# Patient Record
Sex: MALE | Race: WHITE | NOT HISPANIC OR LATINO
[De-identification: names, ages, dates, MRNs, and addresses within clinical notes are randomized per-mention and may not be internally consistent; named-entity substitution may affect disease eponyms.]

---

## 2021-03-25 ENCOUNTER — FORM ENCOUNTER (OUTPATIENT)
Age: 56
End: 2021-03-25

## 2021-03-26 ENCOUNTER — OUTPATIENT (OUTPATIENT)
Dept: INPATIENT UNIT | Facility: HOSPITAL | Age: 56
LOS: 1 days | Discharge: HOME | End: 2021-03-26

## 2021-03-26 ENCOUNTER — TRANSCRIPTION ENCOUNTER (OUTPATIENT)
Age: 56
End: 2021-03-26

## 2021-03-26 ENCOUNTER — APPOINTMENT (OUTPATIENT)
Dept: DISASTER EMERGENCY | Facility: CLINIC | Age: 56
End: 2021-03-26

## 2021-03-26 VITALS
HEART RATE: 76 BPM | WEIGHT: 265 LBS | HEIGHT: 69 IN | DIASTOLIC BLOOD PRESSURE: 88 MMHG | TEMPERATURE: 99 F | OXYGEN SATURATION: 95 % | SYSTOLIC BLOOD PRESSURE: 138 MMHG

## 2021-03-26 VITALS
DIASTOLIC BLOOD PRESSURE: 90 MMHG | HEART RATE: 77 BPM | TEMPERATURE: 99 F | SYSTOLIC BLOOD PRESSURE: 135 MMHG | OXYGEN SATURATION: 95 %

## 2021-03-26 DIAGNOSIS — U07.1 COVID-19: ICD-10-CM

## 2021-03-26 PROBLEM — Z00.00 ENCOUNTER FOR PREVENTIVE HEALTH EXAMINATION: Status: ACTIVE | Noted: 2021-03-26

## 2021-03-26 RX ORDER — SODIUM CHLORIDE 9 MG/ML
250 INJECTION INTRAMUSCULAR; INTRAVENOUS; SUBCUTANEOUS
Refills: 0 | Status: DISCONTINUED | OUTPATIENT
Start: 2021-03-26 | End: 2021-03-26

## 2021-03-26 RX ADMIN — SODIUM CHLORIDE 25 MILLILITER(S): 9 INJECTION INTRAMUSCULAR; INTRAVENOUS; SUBCUTANEOUS at 15:03

## 2021-03-26 NOTE — MONOCLONAL ANTIBODY INFUSION - ASSESSMENT AND PLAN
Patient is COVID + and received Casivirimab and Imdevimab IV over 1 hour, followed by 1 hour of observation.  Pt tolerated procedure well.    Given discharge instructions, Fact Sheet for EUA of REGEN-COV.  Pt education about spirometer use was done and pt was given a spirometer.  Pt advised to follow up with PCP, Dr. Yu. If he develops any SOB or difficulty breathing with pulse ox <92, call PCP or go to ER immediately.  Pt was discharged in stable condition.

## 2021-03-26 NOTE — MONOCLONAL ANTIBODY INFUSION - EXAM
57 yo male here at the monoclonal ab infusion site for Infusion of Casirivimab/Indevimab for COVID.  He was tested positive for COVID on 3/25/21 (yesterday).  He started to develop symptoms- fever, congestion, aches & pains yesterday.  Denies cough, SOB, or difficulty breathing.    Ht=5'9"  Bu=567    VSS  Pulse Ox 95% upon arrival    HEENT: normal  Lungs: clear, BS  equal  Heart: S1S2 regular

## 2021-03-27 ENCOUNTER — TRANSCRIPTION ENCOUNTER (OUTPATIENT)
Age: 56
End: 2021-03-27

## 2021-03-31 ENCOUNTER — TRANSCRIPTION ENCOUNTER (OUTPATIENT)
Age: 56
End: 2021-03-31

## 2021-10-14 ENCOUNTER — APPOINTMENT (OUTPATIENT)
Dept: ORTHOPEDIC SURGERY | Facility: CLINIC | Age: 56
End: 2021-10-14
Payer: COMMERCIAL

## 2021-10-14 VITALS — BODY MASS INDEX: 35.21 KG/M2 | RESPIRATION RATE: 16 BRPM | WEIGHT: 260 LBS | HEIGHT: 72 IN

## 2021-10-14 DIAGNOSIS — Z78.9 OTHER SPECIFIED HEALTH STATUS: ICD-10-CM

## 2021-10-14 DIAGNOSIS — M67.40 GANGLION, UNSPECIFIED SITE: ICD-10-CM

## 2021-10-14 DIAGNOSIS — Z72.89 OTHER PROBLEMS RELATED TO LIFESTYLE: ICD-10-CM

## 2021-10-14 DIAGNOSIS — R22.32 LOCALIZED SWELLING, MASS AND LUMP, LEFT UPPER LIMB: ICD-10-CM

## 2021-10-14 DIAGNOSIS — E78.00 PURE HYPERCHOLESTEROLEMIA, UNSPECIFIED: ICD-10-CM

## 2021-10-14 PROCEDURE — 73140 X-RAY EXAM OF FINGER(S): CPT | Mod: F2

## 2021-10-14 PROCEDURE — 99203 OFFICE O/P NEW LOW 30 MIN: CPT

## 2021-10-14 PROCEDURE — 20612 ASPIRATE/INJ GANGLION CYST: CPT | Mod: F2

## 2021-10-14 PROCEDURE — 76882 US LMTD JT/FCL EVL NVASC XTR: CPT

## 2021-10-14 RX ORDER — ROSUVASTATIN CALCIUM 5 MG/1
TABLET, FILM COATED ORAL
Refills: 0 | Status: ACTIVE | COMMUNITY

## 2021-10-14 NOTE — ASSESSMENT
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 Options ranged from conservative care to surgical intervention were reviewed and all questions answered. Patient appeared to have an excellent understanding of the risks as well as differential diagnosis associated with this condition.\par \par Tabletop test was discussed, as well as the diagnosis of Dupuytren's. Options were discussed ranging from conservative care, needles, and medicines, versus palmar digital fasciectomy.\par \par Since there is no contracture. He elected conservative management and observation.\par \par Differential diagnosis, as well as options for the soft tissue mass was discussed, including further workup, aspiration, and injection, or surgical excision.\par \par He elected to proceed with aspiration of the left third soft tissue mass, which yielded gelatinous material and resolution of the mass.\par \par He elected to proceed with an injection for the left third tendon sheath.\par \par After the area was prepped with alcohol to reduce the risk of infection. The area was aspirated with resolution of the mass. The A1 pulley was then filled with a half cc of Kenalog, 10-1/2 cc of 2% lidocaine. Hemostasis is obtained by direct pressure, and a home program as outlined including surveillance.\par \par It is hoped that this has a long-lasting beneficial therapeutic effect. If symptoms are not fully resolved by 4-6 weeks the patient was asked to return to my office for reevaluation. If symptoms are resolved they can return on an as-needed basis.

## 2021-10-14 NOTE — CONSULT LETTER
[Dear  ___] : Dear  [unfilled], [Consult Letter:] : I had the pleasure of evaluating your patient, [unfilled]. [Please see my note below.] : Please see my note below. [Consult Closing:] : Thank you very much for allowing me to participate in the care of this patient.  If you have any questions, please do not hesitate to contact me. [Sincerely,] : Sincerely, [FreeTextEntry3] : Suhas Keys M.D., FAAOS\par Co-Director\par [facility]\par

## 2021-10-14 NOTE — HISTORY OF PRESENT ILLNESS
[Right] : right hand dominant [FreeTextEntry1] : Pt here for several weeks of left middle volar soft tissue mass that hurts when pressed on.  No trauma to finger.\par \par He is noted thickening at the base of the left fourth digit for several years without history of trauma. This is been without change in his painless.

## 2021-10-14 NOTE — PHYSICAL EXAM
[de-identified] : Physical exam shows the patient to be alert and oriented x3, capable of ambulation. The patient is well-developed and well-nourished in no apparent respiratory distress. Majority of the skin is intact bilaterally in the upper extremities without lymphadenopathy at the elbows.\par \par The wrists have a symmetric range of motion bilaterally. There is no tenderness over the scaphoid, scapholunate or lunotriquetral ligaments bilaterally. There is a negative Garcia test bilaterally. There is negative tenderness over the radial ulnar joint or TFCC and no evidence of instability bilaterally. No tenderness over the pisotriquetral or hamate hook or CMC joint bilaterally. There is 5 over 5 strength of the wrists bilaterally.\par \par There is thickening of the pretendinous fascia of the left fourth digit without contracture. Without tenderness.\par \par There is tenderness of the A1 pulley of the left fourth digit with a 5 mm mass between the A1 and A2 pulley of the left third digit on the radial side. The mass itself is not adherent to the overlying fat or skin is adherent to the underlying tendon sheath. There is no locking upon compression of the A1 pulley and no tenderness or instability. The MP, PIP, and DIP joint with full range of motion of the digits and actively and passive range of motion.\par \par There is good capillary refill of the digits bilaterally.There is no masses or sensitivity over the median and ulnar nerves at the level of the wrist. There is a negative Tinel's and negative Phalen's sign bilaterally. The sensation is grossly intact bilaterally. [de-identified] : PA both hands shows joint space is symmetric bilaterally.\par PA, lateral, and oblique of the left third digit shows no evidence of soft tissue calcifications. The joint space is well-preserved.\par \par Ultrasound shows a hypoechoic mass between the A1 and A2 pulley. A confluence with the tendon sheath of the left third digit. No evidence of joint instability appreciated. Upon stress.\par